# Patient Record
Sex: FEMALE | Race: WHITE | URBAN - METROPOLITAN AREA
[De-identification: names, ages, dates, MRNs, and addresses within clinical notes are randomized per-mention and may not be internally consistent; named-entity substitution may affect disease eponyms.]

---

## 2020-12-03 ENCOUNTER — OFFICE VISIT (OUTPATIENT)
Dept: URBAN - METROPOLITAN AREA CLINIC 72 | Facility: CLINIC | Age: 65
End: 2020-12-03
Payer: MEDICARE

## 2020-12-03 ENCOUNTER — WEB ENCOUNTER (OUTPATIENT)
Dept: URBAN - METROPOLITAN AREA CLINIC 72 | Facility: CLINIC | Age: 65
End: 2020-12-03

## 2020-12-03 VITALS
HEART RATE: 73 BPM | TEMPERATURE: 98.1 F | SYSTOLIC BLOOD PRESSURE: 104 MMHG | WEIGHT: 162 LBS | DIASTOLIC BLOOD PRESSURE: 71 MMHG | BODY MASS INDEX: 29.81 KG/M2 | HEIGHT: 62 IN

## 2020-12-03 DIAGNOSIS — K21.9 GASTROESOPHAGEAL REFLUX DISEASE, UNSPECIFIED WHETHER ESOPHAGITIS PRESENT: ICD-10-CM

## 2020-12-03 DIAGNOSIS — R13.10 ESOPHAGEAL DYSPHAGIA: ICD-10-CM

## 2020-12-03 PROBLEM — 40890009: Status: ACTIVE | Noted: 2020-12-03

## 2020-12-03 PROCEDURE — 3017F COLORECTAL CA SCREEN DOC REV: CPT | Performed by: INTERNAL MEDICINE

## 2020-12-03 PROCEDURE — 1036F TOBACCO NON-USER: CPT | Performed by: INTERNAL MEDICINE

## 2020-12-03 PROCEDURE — G8420 CALC BMI NORM PARAMETERS: HCPCS | Performed by: INTERNAL MEDICINE

## 2020-12-03 PROCEDURE — 99203 OFFICE O/P NEW LOW 30 MIN: CPT | Performed by: INTERNAL MEDICINE

## 2020-12-03 PROCEDURE — G8483 FLU IMM NO ADMIN DOC REA: HCPCS | Performed by: INTERNAL MEDICINE

## 2020-12-03 PROCEDURE — G8427 DOCREV CUR MEDS BY ELIG CLIN: HCPCS | Performed by: INTERNAL MEDICINE

## 2020-12-03 RX ORDER — TRIAMTERENE AND HYDROCHLOROTHIAZIDE 37.5; 25 MG/1; MG/1
1 TABLET IN THE MORNING TABLET ORAL ONCE A DAY
Status: ACTIVE | COMMUNITY

## 2020-12-03 RX ORDER — CITALOPRAM HYDROBROMIDE 20 MG/1
1 TABLET TABLET, FILM COATED ORAL ONCE A DAY
Status: ACTIVE | COMMUNITY

## 2020-12-03 RX ORDER — ALBUTEROL SULFATE 90 UG/1
1 PUFF AS NEEDED AEROSOL, METERED RESPIRATORY (INHALATION)
Status: ACTIVE | COMMUNITY

## 2020-12-03 RX ORDER — LATANOPROST 50 UG/ML
1 DROP INTO AFFECTED EYE IN THE EVENING SOLUTION/ DROPS OPHTHALMIC ONCE A DAY
Status: ACTIVE | COMMUNITY

## 2020-12-03 RX ORDER — OMEPRAZOLE 20 MG/1
1 CAPSULE 30 MINUTES BEFORE MORNING MEAL CAPSULE, DELAYED RELEASE ORAL ONCE A DAY
Qty: 30 | Refills: 1 | OUTPATIENT
Start: 2020-12-03

## 2020-12-03 RX ORDER — FLUTICASONE FUROATE AND VILANTEROL TRIFENATATE 200; 25 UG/1; UG/1
1 PUFF POWDER RESPIRATORY (INHALATION) ONCE A DAY
Status: ACTIVE | COMMUNITY

## 2020-12-03 RX ORDER — CONJUGATED ESTROGENS 0.62 MG/G
AS DIRECTED CREAM VAGINAL
Status: ACTIVE | COMMUNITY

## 2020-12-03 NOTE — HPI-TODAY'S VISIT:
Ms. Paz is a 65-year-old female who presents as a new patient for evaluation of acid reflux.  Her primary care provider is Dr. Lyudmila Corona. She reports longstanding issues with reflux.  She was diagnosed with that in 2013 in [place], she reports at that time she was placed on omeprazole.  She remained on omeprazole for 2 years and had resolution of all of her symptoms however she notes that when she came back to the [facility] in 2015 she went off the medication.  She did well until recently she notes that she has had increasing cough and her dentist said that she is having erosions of her navel secondary to acid.  In addition she reports that over the last 2 to 3 months she has been experiencing intermittent dysphagia to solid foods up to three times a week where she feels like she cannot pass the food bolus.  She denies any regurgitation.  She did try to lose weight back in 2013 to help with her reflux.  She has never had an upper endoscopy. She does have a family history of colon cancer and will be due for repeat colonoscopy in the spring 2021.  Today she feels well.  She has taken Tums with minimal improvement, she has not tried any over-the-counter omeprazole.

## 2020-12-14 ENCOUNTER — OFFICE VISIT (OUTPATIENT)
Dept: URBAN - METROPOLITAN AREA MEDICAL CENTER 40 | Facility: MEDICAL CENTER | Age: 65
End: 2020-12-14
Payer: MEDICARE

## 2020-12-14 DIAGNOSIS — K44.9 DIAPHRAGMATIC HERNIA: ICD-10-CM

## 2020-12-14 DIAGNOSIS — K31.89 ACQUIRED DEFORMITY OF DUODENUM: ICD-10-CM

## 2020-12-14 DIAGNOSIS — K21.9 ACID REFLUX: ICD-10-CM

## 2020-12-14 DIAGNOSIS — K22.70 BARRETT ESOPHAGUS: ICD-10-CM

## 2020-12-14 PROCEDURE — 43239 EGD BIOPSY SINGLE/MULTIPLE: CPT | Performed by: INTERNAL MEDICINE

## 2020-12-31 ENCOUNTER — WEB ENCOUNTER (OUTPATIENT)
Dept: URBAN - METROPOLITAN AREA CLINIC 72 | Facility: CLINIC | Age: 65
End: 2020-12-31

## 2020-12-31 ENCOUNTER — OFFICE VISIT (OUTPATIENT)
Dept: URBAN - METROPOLITAN AREA CLINIC 72 | Facility: CLINIC | Age: 65
End: 2020-12-31
Payer: MEDICARE

## 2020-12-31 VITALS
WEIGHT: 162 LBS | HEIGHT: 62 IN | TEMPERATURE: 97.5 F | SYSTOLIC BLOOD PRESSURE: 104 MMHG | BODY MASS INDEX: 29.81 KG/M2 | DIASTOLIC BLOOD PRESSURE: 66 MMHG | HEART RATE: 70 BPM

## 2020-12-31 DIAGNOSIS — K22.70 BARRETT'S ESOPHAGUS WITHOUT DYSPLASIA: ICD-10-CM

## 2020-12-31 DIAGNOSIS — Z91.89 COLON CANCER HIGH RISK: ICD-10-CM

## 2020-12-31 PROBLEM — 302914006: Status: ACTIVE | Noted: 2020-12-31

## 2020-12-31 PROCEDURE — 1036F TOBACCO NON-USER: CPT | Performed by: INTERNAL MEDICINE

## 2020-12-31 PROCEDURE — 99213 OFFICE O/P EST LOW 20 MIN: CPT | Performed by: INTERNAL MEDICINE

## 2020-12-31 PROCEDURE — 3017F COLORECTAL CA SCREEN DOC REV: CPT | Performed by: INTERNAL MEDICINE

## 2020-12-31 PROCEDURE — G8427 DOCREV CUR MEDS BY ELIG CLIN: HCPCS | Performed by: INTERNAL MEDICINE

## 2020-12-31 PROCEDURE — G8483 FLU IMM NO ADMIN DOC REA: HCPCS | Performed by: INTERNAL MEDICINE

## 2020-12-31 PROCEDURE — G8420 CALC BMI NORM PARAMETERS: HCPCS | Performed by: INTERNAL MEDICINE

## 2020-12-31 RX ORDER — MONTELUKAST SODIUM 10 MG/1
1 TABLET TABLET ORAL ONCE A DAY
Status: ACTIVE | COMMUNITY

## 2020-12-31 RX ORDER — FLUTICASONE FUROATE AND VILANTEROL TRIFENATATE 200; 25 UG/1; UG/1
1 PUFF POWDER RESPIRATORY (INHALATION) ONCE A DAY
Status: ACTIVE | COMMUNITY

## 2020-12-31 RX ORDER — CONJUGATED ESTROGENS 0.62 MG/G
AS DIRECTED CREAM VAGINAL
Status: ACTIVE | COMMUNITY

## 2020-12-31 RX ORDER — OMEPRAZOLE 20 MG/1
1 CAPSULE 30 MINUTES BEFORE MORNING MEAL CAPSULE, DELAYED RELEASE ORAL ONCE A DAY
Qty: 30 | Refills: 1 | Status: ON HOLD | COMMUNITY
Start: 2020-12-03

## 2020-12-31 RX ORDER — TRIAMTERENE AND HYDROCHLOROTHIAZIDE 37.5; 25 MG/1; MG/1
1 TABLET IN THE MORNING TABLET ORAL ONCE A DAY
Status: ACTIVE | COMMUNITY

## 2020-12-31 RX ORDER — ESCITALOPRAM 20 MG/1
1 TABLET TABLET, FILM COATED ORAL ONCE A DAY
Status: ACTIVE | COMMUNITY

## 2020-12-31 RX ORDER — BRIMONIDINE TARTRATE 1 MG/ML
1 DROP INTO AFFECTED EYE SOLUTION/ DROPS OPHTHALMIC
Status: ACTIVE | COMMUNITY

## 2020-12-31 RX ORDER — PANTOPRAZOLE SODIUM 40 MG/1
1 TABLET TABLET, DELAYED RELEASE ORAL ONCE A DAY
Qty: 90 | Refills: 3 | OUTPATIENT
Start: 2020-12-31

## 2020-12-31 RX ORDER — LATANOPROST 50 UG/ML
1 DROP INTO AFFECTED EYE IN THE EVENING SOLUTION/ DROPS OPHTHALMIC ONCE A DAY
Status: ACTIVE | COMMUNITY

## 2020-12-31 RX ORDER — CITALOPRAM HYDROBROMIDE 20 MG/1
1 TABLET TABLET, FILM COATED ORAL ONCE A DAY
Status: ON HOLD | COMMUNITY

## 2020-12-31 RX ORDER — ALBUTEROL SULFATE 90 UG/1
1 PUFF AS NEEDED AEROSOL, METERED RESPIRATORY (INHALATION)
Status: ACTIVE | COMMUNITY

## 2020-12-31 NOTE — HPI-TODAY'S VISIT:
Ms. Paz is a pleasant 65-year-old female who returns for follow-up, last seen in our office on 12/3/2020 as a new patient, referred by Dr. Lyudmila Corona.  Her past medical history is significant for anxiety, GERD and asthma. 9 she reports long-standing issues with reflux diagnosed when she lived in [place] in 2013, at that time she was on omeprazole and did well for a few years.  When she came back to the [facility] in 2015 she transitioned off the medication but reported increasing cough and occasional heartburn.  She took medication intermittently.  Unfortunately 2-3 months prior to presenting to us she reported intermittent dysphagia to solid foods up to 3 times a week where she feels she cannot pass a food bolus, she had no regurgitative symptoms at that time.  She had never had an upper endoscopy.  We recommended evaluation given her dysphagia with an upper endoscopy.  Upper endoscopy was performed without difficulty on 12/14/2020, it was significant for a hiatal hernia roughly 2 cm in size and mild antral gastritis.  Z line was regular in appearance however biopsies did confirm the presence of Davis's esophagus with no dysplasia.  No H. pylori was detected on gastric biopsies.  We recommended repeat endoscopy in 3 years and advised patient to return for follow-up to discuss long-term medication usage.  She feels well, she is working on lifestyle and diet modification. She feels like her omeprazole is not working well, would like change of medication.  She'll be due for colonoscopy in 2021, she has a family history of colon cancer.

## 2021-01-03 ENCOUNTER — WEB ENCOUNTER (OUTPATIENT)
Dept: URBAN - METROPOLITAN AREA CLINIC 72 | Facility: CLINIC | Age: 66
End: 2021-01-03

## 2021-01-07 ENCOUNTER — WEB ENCOUNTER (OUTPATIENT)
Dept: URBAN - METROPOLITAN AREA CLINIC 72 | Facility: CLINIC | Age: 66
End: 2021-01-07

## 2021-01-07 ENCOUNTER — LAB OUTSIDE AN ENCOUNTER (OUTPATIENT)
Dept: URBAN - METROPOLITAN AREA CLINIC 72 | Facility: CLINIC | Age: 66
End: 2021-01-07

## 2021-01-12 ENCOUNTER — WEB ENCOUNTER (OUTPATIENT)
Dept: URBAN - METROPOLITAN AREA CLINIC 72 | Facility: CLINIC | Age: 66
End: 2021-01-12

## 2021-02-19 ENCOUNTER — TELEPHONE ENCOUNTER (OUTPATIENT)
Dept: URBAN - METROPOLITAN AREA CLINIC 72 | Facility: CLINIC | Age: 66
End: 2021-02-19

## 2021-03-01 ENCOUNTER — OFFICE VISIT (OUTPATIENT)
Dept: URBAN - METROPOLITAN AREA MEDICAL CENTER 40 | Facility: MEDICAL CENTER | Age: 66
End: 2021-03-01

## 2021-03-16 ENCOUNTER — OFFICE VISIT (OUTPATIENT)
Dept: URBAN - METROPOLITAN AREA CLINIC 72 | Facility: CLINIC | Age: 66
End: 2021-03-16

## 2021-03-23 ENCOUNTER — WEB ENCOUNTER (OUTPATIENT)
Dept: URBAN - METROPOLITAN AREA CLINIC 72 | Facility: CLINIC | Age: 66
End: 2021-03-23

## 2021-04-06 ENCOUNTER — TELEPHONE ENCOUNTER (OUTPATIENT)
Dept: URBAN - METROPOLITAN AREA CLINIC 113 | Facility: CLINIC | Age: 66
End: 2021-04-06

## 2021-04-12 ENCOUNTER — OFFICE VISIT (OUTPATIENT)
Dept: URBAN - METROPOLITAN AREA MEDICAL CENTER 40 | Facility: MEDICAL CENTER | Age: 66
End: 2021-04-12

## 2021-04-19 ENCOUNTER — WEB ENCOUNTER (OUTPATIENT)
Dept: URBAN - METROPOLITAN AREA CLINIC 72 | Facility: CLINIC | Age: 66
End: 2021-04-19

## 2021-04-20 ENCOUNTER — WEB ENCOUNTER (OUTPATIENT)
Dept: URBAN - METROPOLITAN AREA CLINIC 72 | Facility: CLINIC | Age: 66
End: 2021-04-20

## 2021-04-23 ENCOUNTER — WEB ENCOUNTER (OUTPATIENT)
Dept: URBAN - METROPOLITAN AREA CLINIC 72 | Facility: CLINIC | Age: 66
End: 2021-04-23

## 2022-03-08 ENCOUNTER — TELEPHONE ENCOUNTER (OUTPATIENT)
Dept: URBAN - METROPOLITAN AREA CLINIC 72 | Facility: CLINIC | Age: 67
End: 2022-03-08

## 2022-03-08 VITALS — WEIGHT: 152 LBS | BODY MASS INDEX: 27.97 KG/M2 | HEIGHT: 62 IN

## 2022-03-08 PROBLEM — 102485007 PERSONAL RISK FACTOR: Status: ACTIVE | Noted: 2022-03-08

## 2022-03-08 RX ORDER — LATANOPROST 50 UG/ML
1 DROP INTO AFFECTED EYE IN THE EVENING SOLUTION/ DROPS OPHTHALMIC ONCE A DAY
Status: ACTIVE | COMMUNITY

## 2022-03-08 RX ORDER — FLUTICASONE FUROATE AND VILANTEROL TRIFENATATE 200; 25 UG/1; UG/1
1 PUFF POWDER RESPIRATORY (INHALATION) ONCE A DAY
Status: ON HOLD | COMMUNITY

## 2022-03-08 RX ORDER — ALBUTEROL SULFATE 90 UG/1
1 PUFF AS NEEDED AEROSOL, METERED RESPIRATORY (INHALATION)
Status: ACTIVE | COMMUNITY

## 2022-03-08 RX ORDER — CITALOPRAM HYDROBROMIDE 20 MG/1
1 TABLET TABLET, FILM COATED ORAL ONCE A DAY
COMMUNITY

## 2022-03-08 RX ORDER — BRIMONIDINE TARTRATE 1 MG/ML
1 DROP INTO AFFECTED EYE SOLUTION/ DROPS OPHTHALMIC
Status: ACTIVE | COMMUNITY

## 2022-03-08 RX ORDER — PANTOPRAZOLE SODIUM 40 MG/1
1 TABLET TABLET, DELAYED RELEASE ORAL ONCE A DAY
Qty: 90 | Refills: 3 | Status: ON HOLD | COMMUNITY
Start: 2020-12-31

## 2022-03-08 RX ORDER — OMEPRAZOLE 20 MG/1
1 CAPSULE 30 MINUTES BEFORE MORNING MEAL CAPSULE, DELAYED RELEASE ORAL ONCE A DAY
Qty: 30 | Refills: 1 | COMMUNITY
Start: 2020-12-03

## 2022-03-08 RX ORDER — MONTELUKAST SODIUM 10 MG/1
1 TABLET TABLET ORAL ONCE A DAY
Status: ACTIVE | COMMUNITY

## 2022-03-08 RX ORDER — ESCITALOPRAM 20 MG/1
1 TABLET TABLET, FILM COATED ORAL ONCE A DAY
Status: ACTIVE | COMMUNITY

## 2022-03-08 RX ORDER — CONJUGATED ESTROGENS 0.62 MG/G
AS DIRECTED CREAM VAGINAL
Status: ACTIVE | COMMUNITY

## 2022-03-08 RX ORDER — TRIAMTERENE AND HYDROCHLOROTHIAZIDE 37.5; 25 MG/1; MG/1
1 TABLET IN THE MORNING TABLET ORAL ONCE A DAY
Status: ACTIVE | COMMUNITY

## 2022-03-11 ENCOUNTER — TELEPHONE ENCOUNTER (OUTPATIENT)
Dept: URBAN - METROPOLITAN AREA CLINIC 72 | Facility: CLINIC | Age: 67
End: 2022-03-11

## 2022-03-11 VITALS — HEIGHT: 62 IN | WEIGHT: 153 LBS | BODY MASS INDEX: 28.16 KG/M2

## 2022-03-11 RX ORDER — TRIAMTERENE AND HYDROCHLOROTHIAZIDE 37.5; 25 MG/1; MG/1
1 TABLET IN THE MORNING TABLET ORAL ONCE A DAY
Status: ACTIVE | COMMUNITY

## 2022-03-11 RX ORDER — ALBUTEROL SULFATE 90 UG/1
1 PUFF AS NEEDED AEROSOL, METERED RESPIRATORY (INHALATION)
Status: ACTIVE | COMMUNITY

## 2022-03-11 RX ORDER — PANTOPRAZOLE SODIUM 40 MG/1
1 TABLET TABLET, DELAYED RELEASE ORAL ONCE A DAY
Qty: 90 | Refills: 3 | Status: DISCONTINUED | COMMUNITY
Start: 2020-12-31

## 2022-03-11 RX ORDER — LATANOPROST 50 UG/ML
1 DROP INTO AFFECTED EYE IN THE EVENING SOLUTION/ DROPS OPHTHALMIC ONCE A DAY
Status: ACTIVE | COMMUNITY

## 2022-03-11 RX ORDER — BRIMONIDINE TARTRATE 1 MG/ML
1 DROP INTO AFFECTED EYE SOLUTION/ DROPS OPHTHALMIC
Status: ACTIVE | COMMUNITY

## 2022-03-11 RX ORDER — FLUTICASONE FUROATE AND VILANTEROL TRIFENATATE 200; 25 UG/1; UG/1
1 PUFF POWDER RESPIRATORY (INHALATION) ONCE A DAY
Status: DISCONTINUED | COMMUNITY

## 2022-03-11 RX ORDER — ESCITALOPRAM 20 MG/1
1 TABLET TABLET, FILM COATED ORAL ONCE A DAY
Status: ACTIVE | COMMUNITY

## 2022-03-11 RX ORDER — MONTELUKAST SODIUM 10 MG/1
1 TABLET TABLET ORAL ONCE A DAY
Status: ACTIVE | COMMUNITY

## 2022-03-11 RX ORDER — OMEPRAZOLE 20 MG/1
1 CAPSULE 30 MINUTES BEFORE MORNING MEAL CAPSULE, DELAYED RELEASE ORAL ONCE A DAY
Qty: 30 | Refills: 1 | Status: DISCONTINUED | COMMUNITY
Start: 2020-12-03

## 2022-03-11 RX ORDER — CITALOPRAM HYDROBROMIDE 20 MG/1
1 TABLET TABLET, FILM COATED ORAL ONCE A DAY
Status: DISCONTINUED | COMMUNITY

## 2022-03-11 RX ORDER — CONJUGATED ESTROGENS 0.62 MG/G
AS DIRECTED CREAM VAGINAL
Status: ACTIVE | COMMUNITY

## 2022-03-14 PROBLEM — 312824007 FAMILY HISTORY OF CANCER OF COLON (SITUATION): Status: ACTIVE | Noted: 2022-03-14

## 2022-03-14 PROBLEM — 275978004 COLON CANCER SCREENING: Status: ACTIVE | Noted: 2022-03-14

## 2022-03-14 PROBLEM — 428283002 HISTORY OF POLYP OF COLON: Status: ACTIVE | Noted: 2022-03-14

## 2022-03-30 ENCOUNTER — OFFICE VISIT (OUTPATIENT)
Dept: URBAN - METROPOLITAN AREA MEDICAL CENTER 40 | Facility: MEDICAL CENTER | Age: 67
End: 2022-03-30
Payer: MEDICARE

## 2022-03-30 DIAGNOSIS — Z80.0 FAMILY HISTORY OF ANAL CANCER (HIGH RISK): ICD-10-CM

## 2022-03-30 DIAGNOSIS — Z86.010 COLON CANCER SCREENING (HIGH): ICD-10-CM

## 2022-03-30 PROCEDURE — G0105 COLORECTAL SCRN; HI RISK IND: HCPCS | Performed by: INTERNAL MEDICINE

## 2022-05-27 ENCOUNTER — OFFICE VISIT (OUTPATIENT)
Dept: URBAN - METROPOLITAN AREA MEDICAL CENTER 40 | Facility: MEDICAL CENTER | Age: 67
End: 2022-05-27

## 2022-06-10 ENCOUNTER — OFFICE VISIT (OUTPATIENT)
Dept: URBAN - METROPOLITAN AREA CLINIC 72 | Facility: CLINIC | Age: 67
End: 2022-06-10

## 2023-04-05 ENCOUNTER — WEB ENCOUNTER (OUTPATIENT)
Dept: URBAN - METROPOLITAN AREA CLINIC 72 | Facility: CLINIC | Age: 68
End: 2023-04-05

## 2023-04-11 ENCOUNTER — OFFICE VISIT (OUTPATIENT)
Dept: URBAN - METROPOLITAN AREA CLINIC 72 | Facility: CLINIC | Age: 68
End: 2023-04-11
Payer: MEDICARE

## 2023-04-11 ENCOUNTER — LAB OUTSIDE AN ENCOUNTER (OUTPATIENT)
Dept: URBAN - METROPOLITAN AREA CLINIC 72 | Facility: CLINIC | Age: 68
End: 2023-04-11

## 2023-04-11 VITALS
DIASTOLIC BLOOD PRESSURE: 67 MMHG | TEMPERATURE: 98.2 F | HEART RATE: 80 BPM | BODY MASS INDEX: 28.71 KG/M2 | RESPIRATION RATE: 18 BRPM | SYSTOLIC BLOOD PRESSURE: 101 MMHG | HEIGHT: 62 IN | WEIGHT: 156 LBS

## 2023-04-11 DIAGNOSIS — R13.19 ESOPHAGEAL DYSPHAGIA: ICD-10-CM

## 2023-04-11 DIAGNOSIS — K22.70 BARRETT'S ESOPHAGUS WITHOUT DYSPLASIA: ICD-10-CM

## 2023-04-11 DIAGNOSIS — Z91.89 COLON CANCER HIGH RISK: ICD-10-CM

## 2023-04-11 DIAGNOSIS — K21.9 GASTROESOPHAGEAL REFLUX DISEASE, UNSPECIFIED WHETHER ESOPHAGITIS PRESENT: ICD-10-CM

## 2023-04-11 PROCEDURE — 99214 OFFICE O/P EST MOD 30 MIN: CPT | Performed by: INTERNAL MEDICINE

## 2023-04-11 RX ORDER — ESCITALOPRAM 20 MG/1
1 TABLET TABLET, FILM COATED ORAL ONCE A DAY
Status: ACTIVE | COMMUNITY

## 2023-04-11 RX ORDER — LATANOPROST 50 UG/ML
1 DROP INTO AFFECTED EYE IN THE EVENING SOLUTION/ DROPS OPHTHALMIC ONCE A DAY
Status: ACTIVE | COMMUNITY

## 2023-04-11 RX ORDER — MONTELUKAST SODIUM 10 MG/1
1 TABLET TABLET ORAL ONCE A DAY
Status: ACTIVE | COMMUNITY

## 2023-04-11 RX ORDER — ALBUTEROL SULFATE 90 UG/1
1 PUFF AS NEEDED AEROSOL, METERED RESPIRATORY (INHALATION)
Status: ACTIVE | COMMUNITY

## 2023-04-11 RX ORDER — BRIMONIDINE TARTRATE 1 MG/ML
1 DROP INTO AFFECTED EYE SOLUTION/ DROPS OPHTHALMIC
Status: ACTIVE | COMMUNITY

## 2023-04-11 RX ORDER — CONJUGATED ESTROGENS 0.62 MG/G
AS DIRECTED CREAM VAGINAL
Status: ACTIVE | COMMUNITY

## 2023-04-11 RX ORDER — HYDROCHLOROTHIAZIDE 12.5 MG/1
1 CAPSULE IN THE MORNING CAPSULE, GELATIN COATED ORAL ONCE A DAY
Status: ACTIVE | COMMUNITY

## 2023-04-11 NOTE — HPI-TODAY'S VISIT:
Mrs. Paz returns for follow-up, she was last seen in our office on 12/31/2020.  Recall she is a 68-year-old with history of GERD and Davis's esophagus, hiatal hernia and family history of colon cancer.  She was referred back to us by Dr. Tiffanie Shine for dysphagia.  She had a colonoscopy 3/30/2020 due to family history of colon cancer this was completed through the cecum with no gross abnormalities.  A repeat colonoscopy is recommended in 5 years. She had an upper endoscopy December 2020 showed a 2 cm hiatal hernia with biopsies showing normal stomach but intestinal metaplasia of the esophagus.  She had an MRI of the abdomen 4/14/2021 with without contrast for liver lesion found left to right hepatic lobe lesions that were small suggestive of cysts.  Lab work from PCP showed a normal BNP and normal CBC from February 2023.    She had hiatal hernia repair in 2021.  Now having issues with dysphagia.  She reports that she has had issues with liquids, states that she chokes when she tries to swallow.  This has progressed to now having the sensation that food is getting stuck in her esophagus.  She eats small bites but still has a problem.  She denies any reflux-like symptoms.  Does not have to regurgitate food.

## 2023-04-21 ENCOUNTER — OFFICE VISIT (OUTPATIENT)
Dept: URBAN - METROPOLITAN AREA MEDICAL CENTER 40 | Facility: MEDICAL CENTER | Age: 68
End: 2023-04-21
Payer: MEDICARE

## 2023-04-21 DIAGNOSIS — Z98.890 HISTORY OF NISSEN FUNDOPLICATION: ICD-10-CM

## 2023-04-21 DIAGNOSIS — K21.9 GASTROESOPHAGEAL REFLUX DISEASE, UNSPECIFIED WHETHER ESOPHAGITIS PRESENT: ICD-10-CM

## 2023-04-21 DIAGNOSIS — R13.19 DYSPHAGIA: ICD-10-CM

## 2023-04-21 DIAGNOSIS — K22.70 BARRETT'S ESOPHAGUS WITHOUT DYSPLASIA: ICD-10-CM

## 2023-04-21 DIAGNOSIS — K22.89 DILATATION OF ESOPHAGUS: ICD-10-CM

## 2023-04-21 PROBLEM — 235595009: Status: ACTIVE | Noted: 2023-04-21

## 2023-04-21 PROCEDURE — 43248 EGD GUIDE WIRE INSERTION: CPT | Performed by: INTERNAL MEDICINE

## 2023-04-24 ENCOUNTER — WEB ENCOUNTER (OUTPATIENT)
Dept: URBAN - METROPOLITAN AREA CLINIC 72 | Facility: CLINIC | Age: 68
End: 2023-04-24

## 2023-04-26 ENCOUNTER — WEB ENCOUNTER (OUTPATIENT)
Dept: URBAN - METROPOLITAN AREA CLINIC 72 | Facility: CLINIC | Age: 68
End: 2023-04-26

## 2023-04-28 ENCOUNTER — WEB ENCOUNTER (OUTPATIENT)
Dept: URBAN - METROPOLITAN AREA CLINIC 72 | Facility: CLINIC | Age: 68
End: 2023-04-28

## 2023-04-28 ENCOUNTER — TELEPHONE ENCOUNTER (OUTPATIENT)
Dept: URBAN - METROPOLITAN AREA CLINIC 72 | Facility: CLINIC | Age: 68
End: 2023-04-28

## 2023-06-15 ENCOUNTER — OFFICE VISIT (OUTPATIENT)
Dept: URBAN - METROPOLITAN AREA MEDICAL CENTER 40 | Facility: MEDICAL CENTER | Age: 68
End: 2023-06-15

## 2023-06-20 ENCOUNTER — WEB ENCOUNTER (OUTPATIENT)
Dept: URBAN - METROPOLITAN AREA CLINIC 72 | Facility: CLINIC | Age: 68
End: 2023-06-20

## 2023-06-21 ENCOUNTER — WEB ENCOUNTER (OUTPATIENT)
Dept: URBAN - METROPOLITAN AREA CLINIC 72 | Facility: CLINIC | Age: 68
End: 2023-06-21

## 2023-06-26 ENCOUNTER — WEB ENCOUNTER (OUTPATIENT)
Dept: URBAN - METROPOLITAN AREA CLINIC 72 | Facility: CLINIC | Age: 68
End: 2023-06-26

## 2023-07-06 ENCOUNTER — OFFICE VISIT (OUTPATIENT)
Dept: URBAN - METROPOLITAN AREA CLINIC 72 | Facility: CLINIC | Age: 68
End: 2023-07-06

## 2024-02-14 ENCOUNTER — OV EP (OUTPATIENT)
Dept: URBAN - METROPOLITAN AREA CLINIC 72 | Facility: CLINIC | Age: 69
End: 2024-02-14

## 2024-03-06 ENCOUNTER — OV EP (OUTPATIENT)
Dept: URBAN - METROPOLITAN AREA CLINIC 72 | Facility: CLINIC | Age: 69
End: 2024-03-06
Payer: MEDICARE

## 2024-03-06 VITALS
DIASTOLIC BLOOD PRESSURE: 66 MMHG | WEIGHT: 151.2 LBS | SYSTOLIC BLOOD PRESSURE: 90 MMHG | HEART RATE: 88 BPM | BODY MASS INDEX: 27.82 KG/M2 | HEIGHT: 62 IN | TEMPERATURE: 97.5 F

## 2024-03-06 DIAGNOSIS — K76.89 BENIGN LIVER CYST: ICD-10-CM

## 2024-03-06 DIAGNOSIS — R13.19 ESOPHAGEAL DYSPHAGIA: ICD-10-CM

## 2024-03-06 DIAGNOSIS — K22.70 BARRETT'S ESOPHAGUS WITHOUT DYSPLASIA: ICD-10-CM

## 2024-03-06 DIAGNOSIS — Z91.89 COLON CANCER HIGH RISK: ICD-10-CM

## 2024-03-06 DIAGNOSIS — K21.9 GASTROESOPHAGEAL REFLUX DISEASE, UNSPECIFIED WHETHER ESOPHAGITIS PRESENT: ICD-10-CM

## 2024-03-06 PROBLEM — 85057007: Status: ACTIVE | Noted: 2024-03-06

## 2024-03-06 PROCEDURE — 99214 OFFICE O/P EST MOD 30 MIN: CPT | Performed by: INTERNAL MEDICINE

## 2024-03-06 RX ORDER — HYDROCHLOROTHIAZIDE 12.5 MG/1
1 CAPSULE IN THE MORNING CAPSULE, GELATIN COATED ORAL ONCE A DAY
Status: ON HOLD | COMMUNITY

## 2024-03-06 RX ORDER — BRIMONIDINE TARTRATE 1 MG/ML
1 DROP INTO AFFECTED EYE SOLUTION/ DROPS OPHTHALMIC
Status: ACTIVE | COMMUNITY

## 2024-03-06 RX ORDER — ACETAZOLAMIDE 125 MG/1
TABLET ORAL
Qty: 175 TABLET | Status: ACTIVE | COMMUNITY

## 2024-03-06 RX ORDER — MONTELUKAST SODIUM 10 MG/1
1 TABLET TABLET ORAL ONCE A DAY
Status: ACTIVE | COMMUNITY

## 2024-03-06 RX ORDER — CONJUGATED ESTROGENS 0.62 MG/G
AS DIRECTED CREAM VAGINAL
Status: ACTIVE | COMMUNITY

## 2024-03-06 RX ORDER — CITALOPRAM HYDROBROMIDE 20 MG/1
TABLET ORAL
Qty: 90 TABLET | Status: ACTIVE | COMMUNITY

## 2024-03-06 RX ORDER — APIXABAN 5 MG/1
TABLET, FILM COATED ORAL
Qty: 60 TABLET | Status: ACTIVE | COMMUNITY

## 2024-03-06 RX ORDER — ALBUTEROL SULFATE 90 UG/1
1 PUFF AS NEEDED AEROSOL, METERED RESPIRATORY (INHALATION)
Status: ACTIVE | COMMUNITY

## 2024-03-06 RX ORDER — LATANOPROST 50 UG/ML
1 DROP INTO AFFECTED EYE IN THE EVENING SOLUTION/ DROPS OPHTHALMIC ONCE A DAY
Status: ACTIVE | COMMUNITY

## 2024-03-06 NOTE — EXAM-GENERAL EXAMINATION
General--no acute distress, resting comfortably Eyes--anicteric, no pallor HENT--normocephalic, atraumatic head Neck--no lymphadenopathy, non tender Chest--non labored breathing, equal rise Abdomen--soft, non tender, non distended, no organomegaly Ext: BELKIS, no obvious sores or rashes

## 2024-03-06 NOTE — HPI-TODAY'S VISIT:
Mrs. Paz returns for follow-up.  Recall she is a 69-year-old last seen in office on 4/11/2023.  We been following her for GERD with Davis's esophagus without dysplasia and dysphagia.  She has a family history of colon cancer and has been undergoing routine surveillance last colonoscopy in 2020, due for repeat in 2025.  I last saw her in office she was suffering from episodic dysphagia.  She had a hiatal hernia repair in 2021 for worsening dysphagia since that point in time.  An EGD with dilation was arranged for 4/21/2023, this showed Davis's mucosa and she was empirically dilated with an 18 mm guidewire dilation.  She also prior Nissen fundoplication appeared intact.  Biopsies returned showing no evidence of intestinal metaplasia.  She queries if she needs any follow-up for her liver cysts. REcall, In 2021 she had a an ultrasound with attempted biopsy for a 13 mm hypoenhancing lesion of segment 7.  An MRI performed 4/14/2021 without with contrast for follow-up of this lesion showed 2 benign right hepatic lobe lesions consistent with cysts with no enhancement and a small left renal cyst.  No further follow-up was needed of these lesions given their benign findings.  She had a knee replacement in September and developed a blood clot is on Eliquis.She reports occasional episodes of choking when she swallows.  It is somewhat infrequent.  We discussed that we could order a swallow evaluation but she would like to think about this.  Otherwise she is feeling well today without complaint.